# Patient Record
Sex: FEMALE | Employment: UNEMPLOYED | URBAN - METROPOLITAN AREA
[De-identification: names, ages, dates, MRNs, and addresses within clinical notes are randomized per-mention and may not be internally consistent; named-entity substitution may affect disease eponyms.]

---

## 2017-08-21 ENCOUNTER — HOSPITAL ENCOUNTER (EMERGENCY)
Age: 82
Discharge: HOME | End: 2017-08-21
Attending: EMERGENCY MEDICINE
Payer: MEDICARE

## 2017-08-21 ENCOUNTER — APPOINTMENT (OUTPATIENT)
Dept: CT IMAGING | Age: 82
End: 2017-08-21
Payer: MEDICARE

## 2017-08-21 VITALS
HEART RATE: 68 BPM | DIASTOLIC BLOOD PRESSURE: 88 MMHG | TEMPERATURE: 98 F | OXYGEN SATURATION: 97 % | RESPIRATION RATE: 18 BRPM | SYSTOLIC BLOOD PRESSURE: 163 MMHG

## 2017-08-21 DIAGNOSIS — M79.18 MUSCULOSKELETAL PAIN, CHRONIC: Primary | ICD-10-CM

## 2017-08-21 DIAGNOSIS — G89.29 MUSCULOSKELETAL PAIN, CHRONIC: Primary | ICD-10-CM

## 2017-08-21 LAB
-: NORMAL
ABSOLUTE EOS #: 0.1 K/UL (ref 0–0.4)
ABSOLUTE LYMPH #: 1.2 K/UL (ref 1–4.8)
ABSOLUTE MONO #: 0.5 K/UL (ref 0.1–1.2)
ALBUMIN SERPL-MCNC: 4.1 G/DL (ref 3.5–5.2)
ALBUMIN/GLOBULIN RATIO: 1.2 (ref 1–2.5)
ALP BLD-CCNC: 80 U/L (ref 35–104)
ALT SERPL-CCNC: 15 U/L (ref 5–33)
AMORPHOUS: NORMAL
ANION GAP SERPL CALCULATED.3IONS-SCNC: 18 MMOL/L (ref 9–17)
AST SERPL-CCNC: 18 U/L
BACTERIA: NORMAL
BASOPHILS # BLD: 0 %
BASOPHILS ABSOLUTE: 0 K/UL (ref 0–0.2)
BILIRUB SERPL-MCNC: 0.52 MG/DL (ref 0.3–1.2)
BILIRUBIN URINE: NEGATIVE
BUN BLDV-MCNC: 16 MG/DL (ref 8–23)
BUN/CREAT BLD: ABNORMAL (ref 9–20)
CALCIUM SERPL-MCNC: 9.6 MG/DL (ref 8.6–10.4)
CASTS UA: NORMAL /LPF (ref 0–8)
CHLORIDE BLD-SCNC: 106 MMOL/L (ref 98–107)
CO2: 21 MMOL/L (ref 20–31)
COLOR: YELLOW
COMMENT UA: ABNORMAL
CREAT SERPL-MCNC: 0.96 MG/DL (ref 0.5–0.9)
CRYSTALS, UA: NORMAL /HPF
DIFFERENTIAL TYPE: NORMAL
EOSINOPHILS RELATIVE PERCENT: 1 %
EPITHELIAL CELLS UA: NORMAL /HPF (ref 0–5)
GFR AFRICAN AMERICAN: >60 ML/MIN
GFR NON-AFRICAN AMERICAN: 55 ML/MIN
GFR SERPL CREATININE-BSD FRML MDRD: ABNORMAL ML/MIN/{1.73_M2}
GFR SERPL CREATININE-BSD FRML MDRD: ABNORMAL ML/MIN/{1.73_M2}
GLUCOSE BLD-MCNC: 102 MG/DL (ref 70–99)
GLUCOSE URINE: NEGATIVE
HCT VFR BLD CALC: 39.7 % (ref 36–46)
HEMOGLOBIN: 13.4 G/DL (ref 12–16)
KETONES, URINE: NEGATIVE
LEUKOCYTE ESTERASE, URINE: ABNORMAL
LIPASE: 51 U/L (ref 13–60)
LYMPHOCYTES # BLD: 16 %
MCH RBC QN AUTO: 29.6 PG (ref 26–34)
MCHC RBC AUTO-ENTMCNC: 33.7 G/DL (ref 31–37)
MCV RBC AUTO: 87.7 FL (ref 80–100)
MONOCYTES # BLD: 7 %
MUCUS: NORMAL
NITRITE, URINE: NEGATIVE
OTHER OBSERVATIONS UA: NORMAL
PDW BLD-RTO: 15.1 % (ref 12.5–15.4)
PH UA: 7 (ref 5–8)
PLATELET # BLD: 234 K/UL (ref 140–450)
PLATELET ESTIMATE: NORMAL
PMV BLD AUTO: 8.2 FL (ref 6–12)
POTASSIUM SERPL-SCNC: 4 MMOL/L (ref 3.7–5.3)
PROTEIN UA: NEGATIVE
RBC # BLD: 4.52 M/UL (ref 4–5.2)
RBC # BLD: NORMAL 10*6/UL
RBC UA: NORMAL /HPF (ref 0–4)
RENAL EPITHELIAL, UA: NORMAL /HPF
SEG NEUTROPHILS: 76 %
SEGMENTED NEUTROPHILS ABSOLUTE COUNT: 5.4 K/UL (ref 1.8–7.7)
SODIUM BLD-SCNC: 145 MMOL/L (ref 135–144)
SPECIFIC GRAVITY UA: 1 (ref 1–1.03)
TOTAL PROTEIN: 7.6 G/DL (ref 6.4–8.3)
TRICHOMONAS: NORMAL
TROPONIN INTERP: NORMAL
TROPONIN T: <0.03 NG/ML
TURBIDITY: CLEAR
URINE HGB: NEGATIVE
UROBILINOGEN, URINE: NORMAL
WBC # BLD: 7.2 K/UL (ref 3.5–11)
WBC # BLD: NORMAL 10*3/UL
WBC UA: NORMAL /HPF (ref 0–5)
YEAST: NORMAL

## 2017-08-21 PROCEDURE — 6370000000 HC RX 637 (ALT 250 FOR IP): Performed by: STUDENT IN AN ORGANIZED HEALTH CARE EDUCATION/TRAINING PROGRAM

## 2017-08-21 PROCEDURE — 74177 CT ABD & PELVIS W/CONTRAST: CPT

## 2017-08-21 PROCEDURE — 93005 ELECTROCARDIOGRAM TRACING: CPT

## 2017-08-21 PROCEDURE — 6360000004 HC RX CONTRAST MEDICATION: Performed by: STUDENT IN AN ORGANIZED HEALTH CARE EDUCATION/TRAINING PROGRAM

## 2017-08-21 PROCEDURE — 87186 SC STD MICRODIL/AGAR DIL: CPT

## 2017-08-21 PROCEDURE — 80053 COMPREHEN METABOLIC PANEL: CPT

## 2017-08-21 PROCEDURE — 99284 EMERGENCY DEPT VISIT MOD MDM: CPT

## 2017-08-21 PROCEDURE — 84484 ASSAY OF TROPONIN QUANT: CPT

## 2017-08-21 PROCEDURE — 87086 URINE CULTURE/COLONY COUNT: CPT

## 2017-08-21 PROCEDURE — 83690 ASSAY OF LIPASE: CPT

## 2017-08-21 PROCEDURE — 81001 URINALYSIS AUTO W/SCOPE: CPT

## 2017-08-21 PROCEDURE — 99221 1ST HOSP IP/OBS SF/LOW 40: CPT | Performed by: NEUROLOGICAL SURGERY

## 2017-08-21 PROCEDURE — 87088 URINE BACTERIA CULTURE: CPT

## 2017-08-21 PROCEDURE — 85025 COMPLETE CBC W/AUTO DIFF WBC: CPT

## 2017-08-21 RX ORDER — METOPROLOL TARTRATE 50 MG/1
25 TABLET, FILM COATED ORAL ONCE
Status: COMPLETED | OUTPATIENT
Start: 2017-08-21 | End: 2017-08-21

## 2017-08-21 RX ADMIN — IOVERSOL 130 ML: 741 INJECTION INTRA-ARTERIAL; INTRAVENOUS at 13:13

## 2017-08-21 RX ADMIN — METOPROLOL TARTRATE 25 MG: 50 TABLET, FILM COATED ORAL at 13:52

## 2017-08-21 ASSESSMENT — PAIN SCALES - GENERAL: PAINLEVEL_OUTOF10: 7

## 2017-08-23 ENCOUNTER — HOSPITAL ENCOUNTER (OUTPATIENT)
Age: 82
Discharge: HOME | End: 2017-08-23
Payer: MEDICARE

## 2017-08-23 ENCOUNTER — HOSPITAL ENCOUNTER (OUTPATIENT)
Dept: GENERAL RADIOLOGY | Age: 82
Discharge: HOME | End: 2017-08-23
Payer: MEDICARE

## 2017-08-23 DIAGNOSIS — M79.18 MUSCULOSKELETAL PAIN, CHRONIC: ICD-10-CM

## 2017-08-23 DIAGNOSIS — G89.29 MUSCULOSKELETAL PAIN, CHRONIC: ICD-10-CM

## 2017-08-23 LAB
CULTURE: ABNORMAL
CULTURE: ABNORMAL
EKG ATRIAL RATE: 91 BPM
EKG P AXIS: 58 DEGREES
EKG P-R INTERVAL: 210 MS
EKG Q-T INTERVAL: 394 MS
EKG QRS DURATION: 128 MS
EKG QTC CALCULATION (BAZETT): 484 MS
EKG R AXIS: 63 DEGREES
EKG T AXIS: 27 DEGREES
EKG VENTRICULAR RATE: 91 BPM
Lab: ABNORMAL
ORGANISM: ABNORMAL
SPECIMEN DESCRIPTION: ABNORMAL
STATUS: ABNORMAL

## 2017-08-23 PROCEDURE — 72100 X-RAY EXAM L-S SPINE 2/3 VWS: CPT

## 2020-07-23 ENCOUNTER — OUTSIDE SERVICES (OUTPATIENT)
Dept: PRIMARY CARE CLINIC | Age: 85
End: 2020-07-23

## 2020-07-23 ASSESSMENT — ENCOUNTER SYMPTOMS
EYE PAIN: 0
SHORTNESS OF BREATH: 0
NAUSEA: 0
DIARRHEA: 0
CONSTIPATION: 0
EYE REDNESS: 0
COUGH: 0
BACK PAIN: 0

## 2020-07-23 NOTE — PROGRESS NOTES
Resident seen at [address] [facility] at [facility]. Patient has dementia and was recently admitted to assisted living from nursing. Asked to see resident due to anger with family. Alberto Cabrera is a 80 y.o. female who presents today for her medical conditions/complaintsas noted below. Chief Complaint   Patient presents with    Dementia       HPI:     HPI  Asked to see resident due to anger and or depression regarding recent move into assisted living. She admitted to [facility] after a fall and fractured sternum for rehabilitation. At the end of the month she moved to assisted living. She has a history of Atrial Fib, dementia, COPD, HTN  She is doing well in assisted living except for mood. LDL Cholesterol (mg/dL)   Date Value   03/01/2013 68       (goal LDL is <100)   AST (U/L)   Date Value   08/21/2017 18     ALT (U/L)   Date Value   08/21/2017 15     BUN (mg/dL)   Date Value   08/21/2017 16     BP Readings from Last 3 Encounters:   08/21/17 (!) 163/88   03/21/13 154/75          (goal 120/80)    Past Medical History:   Diagnosis Date    Diverticulosis     GERD (gastroesophageal reflux disease)     H/O tubal ligation     Hip fracture, right (HCC)     History of resection of large bowel     Hypertension     Palpitation     S/P appy     Thyroid cancer ([address]       Past Surgical History:   Procedure Laterality Date    APPENDECTOMY      COLON SURGERY      HIP SURGERY      THYROID SURGERY      THYROIDECTOMY      TUBAL LIGATION         Family History   Problem Relation Age of Onset    Cancer Mother     Heart Disease Father        Social History     Tobacco Use    Smoking status: Never Smoker    Smokeless tobacco: Never Used   Substance Use Topics    Alcohol use: No      Current Outpatient Medications   Medication Sig Dispense Refill    sertraline (ZOLOFT) 50 MG tablet Take 50 mg by mouth daily      aspirin 81 MG tablet Take 81 mg by mouth daily.       multivitamin [facility]) per tablet Take 1 tablet by mouth daily.  acetaminophen (TYLENOL) 650 MG suppository Place 650 mg rectally every 4 hours as needed.  Calcium Carbonate (CALTRATE 600 PO) Take  by mouth.  Levothyroxine Sodium (SYNTHROID PO) Take  by mouth.  ALPRAZOLAM PO Take  by mouth.  METOPROLOL TARTRATE by Does not apply route. No current facility-administered medications for this visit. Allergies   Allergen Reactions    Ibuprofen     Pcn [Penicillins]     Sulfa Antibiotics     Morphine Nausea And Vomiting       Health Maintenance   Topic Date Due    DTaP/Tdap/Td vaccine (1 - Tdap) 08/01/1946    Shingles Vaccine (1 of 2) 08/01/1977    Pneumococcal 65+ years Vaccine (1 of 1 - PPSV23) 08/01/1992    Annual Wellness Visit (AWV)  06/23/2019    Flu vaccine (1) 09/01/2020    Hepatitis A vaccine  Aged Out    Hepatitis B vaccine  Aged Out    Hib vaccine  Aged Out    Meningococcal (ACWY) vaccine  Aged Out       Subjective:      Review of Systems   Constitutional: Negative for chills, fatigue and fever. HENT: Negative for congestion and nosebleeds. Eyes: Negative for pain and redness. Respiratory: Negative for cough and shortness of breath. Cardiovascular: Negative for chest pain, palpitations and leg swelling. Gastrointestinal: Negative for constipation, diarrhea and nausea. Endocrine: Negative for polyphagia and polyuria. Genitourinary: Negative for difficulty urinating and dyspareunia. Musculoskeletal: Positive for gait problem (uses walker). Negative for back pain. Skin: Negative for rash and wound. Neurological: Negative for dizziness, light-headedness and headaches. Psychiatric/Behavioral: Positive for agitation. Negative for sleep disturbance. The patient is nervous/anxious. Objective:     See paper chart for vitals  Physical Exam  Vitals signs reviewed. Constitutional:       Appearance: Normal appearance.    HENT:      Head: Normocephalic and

## 2020-08-12 ENCOUNTER — OUTSIDE SERVICES (OUTPATIENT)
Dept: PRIMARY CARE CLINIC | Age: 85
End: 2020-08-12

## 2020-08-12 RX ORDER — SERTRALINE HYDROCHLORIDE 25 MG/1
25 TABLET, FILM COATED ORAL NIGHTLY
Qty: 30 TABLET | Refills: 3 | Status: SHIPPED | OUTPATIENT
Start: 2020-08-12 | End: 2021-01-06 | Stop reason: SDUPTHER

## 2020-08-12 ASSESSMENT — ENCOUNTER SYMPTOMS
DIARRHEA: 0
BACK PAIN: 0
COUGH: 0
EYE PAIN: 0
CONSTIPATION: 0
EYE REDNESS: 0
SHORTNESS OF BREATH: 0

## 2020-08-12 NOTE — PROGRESS NOTES
Stefany Duarte is a 80 y.o. female being seen for her monthly follow up. Location of visit: [facility]    Visit Date:  8/12/2020       Reason for Visit:  No chief complaint on file. HPI : Dav Mejia, her daughter would like someone to a call regarding dementia. Patient still gets very angry with Dav Mejia and her siblings when they call or visit with her. Patient does not think she need to be in assisted living and insist that she can take care of herself. Review of Systems   Constitutional: Positive for fatigue. Negative for chills and fever. HENT: Negative for congestion and nosebleeds. Eyes: Negative for pain and redness. Respiratory: Negative for cough and shortness of breath. Cardiovascular: Positive for leg swelling. Negative for chest pain and palpitations. Gastrointestinal: Negative for constipation and diarrhea. Genitourinary: Negative for difficulty urinating and dysuria. Musculoskeletal: Positive for gait problem. Negative for back pain. Skin: Negative for rash and wound. Neurological: Negative for dizziness, light-headedness and headaches. Psychiatric/Behavioral: Positive for agitation and dysphoric mood. Negative for sleep disturbance. The patient is nervous/anxious. Physical Exam  Vitals signs and nursing note reviewed. Constitutional:       Appearance: Normal appearance. HENT:      Right Ear: External ear normal.      Left Ear: External ear normal.   Neck:      Musculoskeletal: Normal range of motion and neck supple. Cardiovascular:      Rate and Rhythm: Normal rate and regular rhythm. Heart sounds: Normal heart sounds. Comments: No carotid bruit  Pulmonary:      Effort: Pulmonary effort is normal.      Breath sounds: Normal breath sounds. Abdominal:      General: Bowel sounds are normal.      Palpations: Abdomen is soft. Musculoskeletal:      Comments: Minimal ankle edema this morning   Skin:     General: Skin is warm and dry. Capillary Refill: Capillary refill takes less than 2 seconds. Neurological:      Mental Status: She is alert. Psychiatric:         Mood and Affect: Mood is depressed. Affect is angry. Speech: Speech normal.         Behavior: Behavior normal.          Current Outpatient Medications   Medication Sig Dispense Refill    sertraline (ZOLOFT) 25 MG tablet Take 1 tablet by mouth nightly 30 tablet 3    aspirin 81 MG tablet Take 81 mg by mouth daily.  multivitamin (THERAGRAN) per tablet Take 1 tablet by mouth daily.  acetaminophen (TYLENOL) 650 MG suppository Place 650 mg rectally every 4 hours as needed.  Calcium Carbonate (CALTRATE 600 PO) Take  by mouth.  Levothyroxine Sodium (SYNTHROID PO) Take  by mouth.  ALPRAZOLAM PO Take  by mouth.  METOPROLOL TARTRATE by Does not apply route. No current facility-administered medications for this visit. Allergies   Allergen Reactions    Ibuprofen     Pcn [Penicillins]     Sulfa Antibiotics     Morphine Nausea And Vomiting        Past Medical History:   Diagnosis Date    Diverticulosis     GERD (gastroesophageal reflux disease)     H/O tubal ligation     Hip fracture, right (HCC)     History of resection of large bowel     Hypertension     Palpitation     S/P appy     Thyroid [address] ([facility] [address]         ASSESSMENT:   Diagnosis Orders   1. Essential hypertension  CBC With Auto Differential    Comprehensive Metabolic Panel, Fasting    TSH With Reflex Ft4   2. Reactive depression  sertraline (ZOLOFT) 25 MG tablet   3. Hypothyroidism, unspecified type  CBC With Auto Differential    Comprehensive Metabolic Panel, Fasting    TSH With Reflex Ft4   4. Medication management  sertraline (ZOLOFT) 25 MG tablet   5. Dementia without behavioral disturbance, unspecified dementia type ([facility])  CBC With Auto Differential    Comprehensive Metabolic Panel, Fasting    TSH With Reflex Ft4        Plan:  1. Zoloft 25 mg PO QHS  2.  CBC with diff., CMP., TSH with reflex  3. Will do Eugene Randhawa Exam  4.  Look through skilled notes for cognitive exam

## 2020-08-13 ENCOUNTER — OUTSIDE SERVICES (OUTPATIENT)
Dept: PRIMARY CARE CLINIC | Age: 85
End: 2020-08-13

## 2020-08-13 ASSESSMENT — ENCOUNTER SYMPTOMS
SHORTNESS OF BREATH: 0
EYE PAIN: 0
CHOKING: 0
CONSTIPATION: 0
DIARRHEA: 0
EYE REDNESS: 0
BACK PAIN: 1

## 2020-08-14 LAB
ALBUMIN SERPL-MCNC: NORMAL G/DL
ALP BLD-CCNC: NORMAL U/L
ALT SERPL-CCNC: NORMAL U/L
AST SERPL-CCNC: NORMAL U/L
BASOPHILS ABSOLUTE: NORMAL
BASOPHILS RELATIVE PERCENT: NORMAL
BILIRUB SERPL-MCNC: NORMAL MG/DL
BUN BLDV-MCNC: NORMAL MG/DL
CALCIUM SERPL-MCNC: NORMAL MG/DL
CHLORIDE BLD-SCNC: NORMAL MMOL/L
CO2: NORMAL
CREAT SERPL-MCNC: NORMAL MG/DL
EOSINOPHILS ABSOLUTE: NORMAL
EOSINOPHILS RELATIVE PERCENT: NORMAL
GLUCOSE FASTING: NORMAL
HCT VFR BLD CALC: NORMAL %
HEMOGLOBIN: NORMAL
LYMPHOCYTES ABSOLUTE: NORMAL
LYMPHOCYTES RELATIVE PERCENT: NORMAL
MCH RBC QN AUTO: NORMAL PG
MCHC RBC AUTO-ENTMCNC: NORMAL G/DL
MCV RBC AUTO: NORMAL FL
MONOCYTES ABSOLUTE: NORMAL
MONOCYTES RELATIVE PERCENT: NORMAL
NEUTROPHILS ABSOLUTE: NORMAL
NEUTROPHILS RELATIVE PERCENT: NORMAL
PDW BLD-RTO: NORMAL %
PLATELET # BLD: NORMAL 10*3/UL
PMV BLD AUTO: NORMAL FL
POTASSIUM SERPL-SCNC: NORMAL MMOL/L
RBC # BLD: NORMAL 10*6/UL
SODIUM BLD-SCNC: NORMAL MMOL/L
TOTAL PROTEIN: NORMAL
TSH SERPL DL<=0.05 MIU/L-ACNC: NORMAL M[IU]/L
WBC # BLD: NORMAL 10*3/UL

## 2020-08-14 NOTE — PROGRESS NOTES
Telly Kamara is a 80 y.o. female being seen for her dementia testing follow up. Location of visit: [facility]    Visit Date:  8/13/2020       Reason for Visit:    Chief Complaint   Patient presents with    Dementia      HPI:  Follow up today to [facility] Cognitive Assessment   Resident was receptive to answer questions but frequently reminded me she was in her 80's and a little forgetfulness is to expected. She also states she could never draw well. Review of Systems   Constitutional: Positive for fatigue. Negative for chills and fever. HENT: Negative for congestion and nosebleeds. Eyes: Negative for pain and redness. Respiratory: Negative for choking and shortness of breath. Cardiovascular: Negative for chest pain and palpitations. Gastrointestinal: Negative for constipation and diarrhea. Genitourinary: Negative for difficulty urinating and dysuria. Musculoskeletal: Positive for arthralgias and back pain. Skin: Negative for rash and wound. Neurological: Negative for light-headedness and headaches. Psychiatric/Behavioral: Positive for agitation and dysphoric mood. Negative for confusion (denies) and sleep disturbance. The patient is not nervous/anxious. Physical Exam  Vitals signs and nursing note reviewed. Constitutional:       Appearance: Normal appearance. HENT:      Head: Normocephalic and atraumatic. Neck:      Musculoskeletal: Normal range of motion and neck supple. Pulmonary:      Effort: Pulmonary effort is normal.   Neurological:      Mental Status: She is alert and oriented to person, place, and time. Motor: No weakness. Psychiatric:         Mood and Affect: Affect is angry. Cognition and Memory: Cognition is impaired. Memory is impaired. Comments: MOCA score 20/30  Resident stated again that some loss of memory is normal for someone in her 90s.  She became angry when I suggested that assisted living was an appropriate placement and the extra assistance help keep her safe. She stated if she  at home that would be OK with her. Current Outpatient Medications   Medication Sig Dispense Refill    sertraline (ZOLOFT) 25 MG tablet Take 1 tablet by mouth nightly 30 tablet 3    aspirin 81 MG tablet Take 81 mg by mouth daily.  multivitamin (THERAGRAN) per tablet Take 1 tablet by mouth daily.  acetaminophen (TYLENOL) 650 MG suppository Place 650 mg rectally every 4 hours as needed.  Calcium Carbonate (CALTRATE 600 PO) Take  by mouth.  Levothyroxine Sodium (SYNTHROID PO) Take  by mouth.  ALPRAZOLAM PO Take  by mouth.  METOPROLOL TARTRATE by Does not apply route. No current facility-administered medications for this visit. Allergies   Allergen Reactions    Ibuprofen     Pcn [Penicillins]     Sulfa Antibiotics     Morphine Nausea And Vomiting        Past Medical History:   Diagnosis Date    Diverticulosis     GERD (gastroesophageal reflux disease)     H/O tubal ligation     Hip fracture, right (HCC)     History of resection of large bowel     Hypertension     Palpitation     S/P appy     [address] [address]         ASSESSMENT:   Diagnosis Orders   1. Medication management     2. Dementia without behavioral disturbance, unspecified dementia type ([facility] [address]     3. Anger          Plan:  Continue AL placement and assistance with ADLs  Spoke with daughter, Albina Wilburn, by phone and gave results and agreed with family thoughts that AL was appropriate at this time. Encouraged her to try to continue to be supportive and loving to patient even when she gets angry and lashes out at them. It is probably the dementia effecting her short term thoughts and emotions.    Zoloft ordered yesterday as well as lab work

## 2020-09-17 ENCOUNTER — OUTSIDE SERVICES (OUTPATIENT)
Dept: PRIMARY CARE CLINIC | Age: 85
End: 2020-09-17

## 2020-09-17 VITALS — RESPIRATION RATE: 16 BRPM | TEMPERATURE: 97.6 F | DIASTOLIC BLOOD PRESSURE: 80 MMHG | SYSTOLIC BLOOD PRESSURE: 142 MMHG

## 2020-09-17 ASSESSMENT — ENCOUNTER SYMPTOMS
EYE REDNESS: 0
EYE PAIN: 0
BACK PAIN: 1
CHOKING: 0
DIARRHEA: 0
SHORTNESS OF BREATH: 0
CONSTIPATION: 0

## 2020-09-28 ENCOUNTER — OUTSIDE SERVICES (OUTPATIENT)
Dept: PRIMARY CARE CLINIC | Age: 85
End: 2020-09-28

## 2020-09-28 ASSESSMENT — ENCOUNTER SYMPTOMS
EYE DISCHARGE: 0
VOMITING: 0
BACK PAIN: 1
EYE REDNESS: 0
SORE THROAT: 0
DIARRHEA: 0
ABDOMINAL PAIN: 0
NAUSEA: 0
SHORTNESS OF BREATH: 0
WHEEZING: 0
RHINORRHEA: 0
COUGH: 0

## 2020-09-29 NOTE — PROGRESS NOTES
Garrett Cheatham is a 80 y.o. female being seen for her  History and Physical .  [facility] visit[facility]    Visit Date:  9/28/2020       Reason for Visit:    Chief Complaint   Patient presents with    H&P        She is generally pleasant and cooperative however gets upset when discusses being at facility. She still expresses some anger about being at [facility]. She continues to be particularly angry with family. She does have early dementia and down plays her \"forgetfullness\"  Relates to some minor back and hand pain         Review of Systems   Constitutional: Negative for chills and fever. HENT: Negative for congestion, rhinorrhea and sore throat. Eyes: Negative for discharge and redness. Respiratory: Negative for cough, shortness of breath and wheezing. Cardiovascular: Negative for chest pain and palpitations. Gastrointestinal: Negative for abdominal pain, diarrhea, nausea and vomiting. Endocrine: Negative for polydipsia, polyphagia and polyuria. Genitourinary: Negative for difficulty urinating, dysuria and frequency. Musculoskeletal: Positive for arthralgias, back pain and gait problem. Negative for myalgias. Skin: Negative for rash and wound. Neurological: Negative for dizziness, light-headedness and headaches. Psychiatric/Behavioral: Positive for agitation. Negative for dysphoric mood and sleep disturbance. The patient is not nervous/anxious. Physical Exam  Vitals signs and nursing note reviewed. Constitutional:       Appearance: She is obese. HENT:      Head: Normocephalic and atraumatic. Right Ear: External ear normal.      Left Ear: External ear normal.   Eyes:      Extraocular Movements: Extraocular movements intact. Conjunctiva/sclera: Conjunctivae normal.   Neck:      Musculoskeletal: Normal range of motion and neck supple. Cardiovascular:      Rate and Rhythm: Normal rate and regular rhythm.       Comments: No carotid bruit  Pulmonary: Effort: Pulmonary effort is normal.      Breath sounds: Normal breath sounds. Abdominal:      General: Bowel sounds are normal.      Palpations: Abdomen is soft. Tenderness: There is no abdominal tenderness. Musculoskeletal:      Right lower leg: No edema. Left lower leg: No edema. Skin:     General: Skin is warm and dry. Capillary Refill: Capillary refill takes less than 2 seconds. Neurological:      Mental Status: She is alert and oriented to person, place, and time. Sensory: No sensory deficit. Motor: No weakness. Gait: Gait abnormal (uses rollator). Psychiatric:         Mood and Affect: Affect is angry (when talking about being at [facility]). Speech: Speech normal.         Behavior: Behavior normal. Behavior is cooperative. Cognition and Memory: She exhibits impaired recent memory. Current Outpatient Medications   Medication Sig Dispense Refill    sertraline (ZOLOFT) 25 MG tablet Take 1 tablet by mouth nightly 30 tablet 3    aspirin 81 MG tablet Take 81 mg by mouth daily.  multivitamin (THERAGRAN) per tablet Take 1 tablet by mouth daily.  acetaminophen (TYLENOL) 650 MG suppository Place 650 mg rectally every 4 hours as needed.  Calcium Carbonate (CALTRATE 600 PO) Take  by mouth.  Levothyroxine Sodium (SYNTHROID PO) Take  by mouth.  ALPRAZOLAM PO Take  by mouth.  METOPROLOL TARTRATE by Does not apply route. No current facility-administered medications for this visit.          Allergies   Allergen Reactions    Ibuprofen     Pcn [Penicillins]     Sulfa Antibiotics     Morphine Nausea And Vomiting        Past Medical History:   Diagnosis Date    Diverticulosis     GERD (gastroesophageal reflux disease)     H/O tubal ligation     Hip fracture, right (HCC)     History of resection of large bowel     Hypertension     Palpitation     S/P appy     [address] [facility] [address] [address]         ASSESSMENT:  There were no vitals taken for this visit. Diagnosis Orders   1. Essential hypertension     2. Hypothyroidism, unspecified type     3. Dementia without behavioral disturbance, unspecified dementia type ([facility] [address]          Plan:  Continue current medications. Appropriate to move to independent living.   May help with some anger

## 2020-10-21 RX ORDER — DONEPEZIL HYDROCHLORIDE 10 MG/1
10 TABLET, FILM COATED ORAL NIGHTLY
Qty: 90 TABLET | Refills: 1
Start: 2020-10-21

## 2020-10-21 RX ORDER — IBUPROFEN 600 MG/1
600 TABLET ORAL 3 TIMES DAILY PRN
Qty: 90 TABLET | Refills: 0
Start: 2020-10-21 | End: 2021-05-03 | Stop reason: SINTOL

## 2020-10-21 RX ORDER — POLYETHYLENE GLYCOL 3350 17 G/17G
17 POWDER, FOR SOLUTION ORAL DAILY PRN
Qty: 510 G | Refills: 0
Start: 2020-10-21 | End: 2020-11-20

## 2020-10-21 RX ORDER — METOPROLOL TARTRATE 75 MG/1
37.5 TABLET, FILM COATED ORAL DAILY
Qty: 60 TABLET | Refills: 2
Start: 2020-10-21

## 2020-10-21 RX ORDER — LEVOTHYROXINE SODIUM 0.15 MG/1
150 TABLET ORAL DAILY
COMMUNITY
Start: 2020-07-14 | End: 2020-10-21 | Stop reason: SDUPTHER

## 2020-10-21 RX ORDER — LEVOTHYROXINE SODIUM 175 UG/1
175 TABLET ORAL DAILY
Qty: 30 TABLET | Refills: 3 | Status: SHIPPED | OUTPATIENT
Start: 2020-10-21 | End: 2021-01-25 | Stop reason: SDUPTHER

## 2020-10-21 RX ORDER — FUROSEMIDE 20 MG/1
20 TABLET ORAL DAILY
Qty: 90 TABLET | Refills: 1
Start: 2020-10-21 | End: 2021-01-06 | Stop reason: SDUPTHER

## 2020-10-21 RX ORDER — GREEN TEA/HOODIA GORDONII 315-12.5MG
1 CAPSULE ORAL DAILY
Qty: 30 TABLET | Refills: 3 | Status: SHIPPED | OUTPATIENT
Start: 2020-10-21 | End: 2020-11-20

## 2020-10-21 RX ORDER — BUSPIRONE HYDROCHLORIDE 5 MG/1
5 TABLET ORAL 2 TIMES DAILY
Qty: 60 TABLET | Refills: 0
Start: 2020-10-21 | End: 2020-11-20

## 2020-10-21 RX ORDER — TRAMADOL HYDROCHLORIDE 50 MG/1
50 TABLET ORAL 3 TIMES DAILY PRN
Qty: 20 TABLET | Refills: 0
Start: 2020-10-21 | End: 2020-10-28

## 2020-10-21 RX ORDER — DOCUSATE SODIUM 100 MG/1
100 CAPSULE, LIQUID FILLED ORAL DAILY
Qty: 60 CAPSULE | Refills: 0
Start: 2020-10-21 | End: 2020-11-20

## 2020-10-21 ASSESSMENT — ENCOUNTER SYMPTOMS
VOMITING: 0
DIARRHEA: 1
COUGH: 0
SHORTNESS OF BREATH: 0
NAUSEA: 1
ANAL BLEEDING: 0

## 2020-10-22 NOTE — PROGRESS NOTES
Stephanie Riggins is a 80 y.o. female being seen for her acute follow up. Location of visit: [facility] and  patient residence    Visit Date:  10/21/2020       Reason for Visit:  No chief complaint on file. Diarrhea and upset stomach started Sunday night. She had diarrhea all day yesterday but is feeling a little better today and starting to get appetite back. TSH is high  She is still angry about being at [facility]       Review of Systems   Constitutional: Positive for appetite change and fatigue. Negative for chills and fever. Respiratory: Negative for cough and shortness of breath. Cardiovascular: Negative for chest pain and leg swelling. Gastrointestinal: Positive for diarrhea and nausea. Negative for anal bleeding and vomiting. Endocrine: Negative for polydipsia and polyphagia. Genitourinary: Negative for difficulty urinating and dysuria. Musculoskeletal: Positive for gait problem. Negative for myalgias. Skin: Negative for rash and wound. Neurological: Negative for dizziness, light-headedness and headaches. Psychiatric/Behavioral: Positive for agitation. Negative for sleep disturbance. The patient is nervous/anxious. Physical Exam  Vitals signs and nursing note reviewed. Constitutional:       General: She is not in acute distress. Appearance: She is well-developed. She is not ill-appearing. HENT:      Head: Normocephalic and atraumatic. Right Ear: External ear normal.      Left Ear: External ear normal.   Eyes:      General: No scleral icterus. Right eye: No discharge. Left eye: No discharge. Conjunctiva/sclera: Conjunctivae normal.      Pupils: Pupils are equal, round, and reactive to light. Neck:      Thyroid: No thyromegaly. Trachea: No tracheal deviation. Cardiovascular:      Rate and Rhythm: Normal rate and regular rhythm. Heart sounds: Normal heart sounds.    Pulmonary:      Effort: Pulmonary effort is normal. No respiratory distress. Breath sounds: Normal breath sounds. No wheezing. Abdominal:      General: Bowel sounds are increased. Palpations: Abdomen is soft. Tenderness: There is no abdominal tenderness. Lymphadenopathy:      Cervical: No cervical adenopathy. Skin:     General: Skin is warm. Capillary Refill: Capillary refill takes less than 2 seconds. Findings: No rash. Neurological:      Mental Status: She is alert and oriented to person, place, and time. Gait: Gait abnormal.   Psychiatric:         Mood and Affect: Mood normal.         Behavior: Behavior normal.         Thought Content: Thought content normal.          Current Outpatient Medications   Medication Sig Dispense Refill    metoprolol tartrate (LOPRESSOR) 25 MG tablet Take 25 mg by mouth daily      levothyroxine (SYNTHROID) 175 MCG tablet Take 1 tablet by mouth daily 30 tablet 3    busPIRone (BUSPAR) 5 MG tablet Take 1 tablet by mouth 2 times daily 60 tablet 0    docusate sodium (COLACE) 100 MG capsule Take 1 capsule by mouth daily 60 capsule 0    donepezil (ARICEPT) 10 MG tablet Take 1 tablet by mouth nightly 90 tablet 1    furosemide (LASIX) 20 MG tablet Take 1 tablet by mouth daily 90 tablet 1    Metoprolol Tartrate 75 MG TABS Take 37.5 mg by mouth daily 60 tablet 2    ibuprofen (ADVIL;MOTRIN) 600 MG tablet Take 1 tablet by mouth 3 times daily as needed for Pain 90 tablet 0    polyethylene glycol (GLYCOLAX) 17 GM/SCOOP powder Take 17 g by mouth daily as needed (constipation) 510 g 0    traMADol (ULTRAM) 50 MG tablet Take 1 tablet by mouth 3 times daily as needed for Pain for up to 7 days. 20 tablet 0    Probiotic Acidophilus (FLORANEX) TABS Take 1 tablet by mouth daily 30 tablet 3    sertraline (ZOLOFT) 25 MG tablet Take 1 tablet by mouth nightly 30 tablet 3    aspirin 81 MG tablet Take 81 mg by mouth daily.  multivitamin (THERAGRAN) per tablet Take 1 tablet by mouth daily.       acetaminophen (TYLENOL) 650 MG suppository Place 650 mg rectally every 4 hours as needed.  Calcium Carbonate (CALTRATE 600 PO) Take  by mouth. No current facility-administered medications for this visit. Allergies   Allergen Reactions    Ibuprofen     Pcn [Penicillins]     Sulfa Antibiotics     Morphine Nausea And Vomiting        Past Medical History:   Diagnosis Date    Diverticulosis     GERD (gastroesophageal reflux disease)     H/O tubal ligation     Hip fracture, right (HCC)     History of resection of large bowel     Hypertension     Palpitation     S/P appy     Thyroid cancer ([facility] [address]         ASSESSMENT:  There were no vitals taken for this visit. Diagnosis Orders   1. Hypothyroidism, unspecified type  levothyroxine (SYNTHROID) 175 MCG tablet    TSH    T4, Free   2. Dementia without behavioral disturbance, unspecified dementia type (HCC)  donepezil (ARICEPT) 10 MG tablet   3. Gastroenteritis  Probiotic Acidophilus (FLORANEX) TABS   4. Essential hypertension  furosemide (LASIX) 20 MG tablet    Metoprolol Tartrate 75 MG TABS   5. Constipation, unspecified constipation type  docusate sodium (COLACE) 100 MG capsule    polyethylene glycol (GLYCOLAX) 17 GM/SCOOP powder   6. Sternum pain  ibuprofen (ADVIL;MOTRIN) 600 MG tablet    traMADol (ULTRAM) 50 MG tablet   7. Anger  busPIRone (BUSPAR) 5 MG tablet        Plan:  1. Increase levothyroxine to 175 mcg PO QD  2. Repeat TSH & Free T4 in 3 months  3. Probiotic Acidophilus PO QD  4. Hold docusate sodium if having diarrhea  5.  Will give flu shot next week

## 2020-10-28 ENCOUNTER — OUTSIDE SERVICES (OUTPATIENT)
Dept: PRIMARY CARE CLINIC | Age: 85
End: 2020-10-28

## 2020-10-28 VITALS
SYSTOLIC BLOOD PRESSURE: 132 MMHG | TEMPERATURE: 98.5 F | HEART RATE: 60 BPM | DIASTOLIC BLOOD PRESSURE: 80 MMHG | RESPIRATION RATE: 18 BRPM

## 2020-10-28 ASSESSMENT — ENCOUNTER SYMPTOMS
CONSTIPATION: 0
RHINORRHEA: 0
EYE REDNESS: 0
SINUS PAIN: 0
NAUSEA: 0
SHORTNESS OF BREATH: 0
WHEEZING: 0
BACK PAIN: 1
EYE PAIN: 0
DIARRHEA: 0
COUGH: 1

## 2020-10-29 NOTE — PROGRESS NOTES
Angie Reveles is a 80 y.o. female being seen for her weekly follow up. Location of visit: [facility] and  patient residence    Visit Date:  10/28/2020       Reason for Visit:    Chief Complaint   Patient presents with    URI        HPI   She continues to have a cold with cough and congestion. Covid-19 test negative  Denies fever and chills  States generally improving and cough is subsiding    Review of Systems   Constitutional: Positive for fatigue. Negative for chills and fever. HENT: Positive for congestion. Negative for postnasal drip, rhinorrhea and sinus pain. Eyes: Negative for pain and redness. Respiratory: Positive for cough. Negative for shortness of breath and wheezing. Cardiovascular: Negative for chest pain, palpitations and leg swelling. Gastrointestinal: Negative for constipation, diarrhea and nausea. Genitourinary: Negative for difficulty urinating and dysuria. Musculoskeletal: Positive for back pain and gait problem. Skin: Negative for rash and wound. Neurological: Negative for light-headedness and headaches. Psychiatric/Behavioral: Positive for agitation. Negative for dysphoric mood and sleep disturbance. The patient is not nervous/anxious. Physical Exam  Vitals signs and nursing note reviewed. Constitutional:       General: She is not in acute distress. Appearance: She is well-developed. She is not ill-appearing. HENT:      Head: Normocephalic and atraumatic. Right Ear: External ear normal.      Left Ear: External ear normal.   Eyes:      General: No scleral icterus. Right eye: No discharge. Left eye: No discharge. Conjunctiva/sclera: Conjunctivae normal.      Pupils: Pupils are equal, round, and reactive to light. Neck:      Thyroid: No thyromegaly. Trachea: No tracheal deviation. Cardiovascular:      Rate and Rhythm: Normal rate and regular rhythm. Heart sounds: Normal heart sounds. Pulmonary:      Effort: Pulmonary effort is normal. No respiratory distress. Breath sounds: Wheezing present. Abdominal:      General: Bowel sounds are normal.      Palpations: Abdomen is soft. Lymphadenopathy:      Cervical: No cervical adenopathy. Skin:     General: Skin is warm. Capillary Refill: Capillary refill takes less than 2 seconds. Findings: No rash. Neurological:      Mental Status: She is alert and oriented to person, place, and time. Psychiatric:         Mood and Affect: Mood normal.         Behavior: Behavior normal.         Thought Content: Thought content normal.          Current Outpatient Medications   Medication Sig Dispense Refill    metoprolol tartrate (LOPRESSOR) 25 MG tablet Take 25 mg by mouth daily      levothyroxine (SYNTHROID) 175 MCG tablet Take 1 tablet by mouth daily 30 tablet 3    busPIRone (BUSPAR) 5 MG tablet Take 1 tablet by mouth 2 times daily 60 tablet 0    docusate sodium (COLACE) 100 MG capsule Take 1 capsule by mouth daily 60 capsule 0    donepezil (ARICEPT) 10 MG tablet Take 1 tablet by mouth nightly 90 tablet 1    furosemide (LASIX) 20 MG tablet Take 1 tablet by mouth daily 90 tablet 1    Metoprolol Tartrate 75 MG TABS Take 37.5 mg by mouth daily 60 tablet 2    ibuprofen (ADVIL;MOTRIN) 600 MG tablet Take 1 tablet by mouth 3 times daily as needed for Pain 90 tablet 0    polyethylene glycol (GLYCOLAX) 17 GM/SCOOP powder Take 17 g by mouth daily as needed (constipation) 510 g 0    traMADol (ULTRAM) 50 MG tablet Take 1 tablet by mouth 3 times daily as needed for Pain for up to 7 days. 20 tablet 0    Probiotic Acidophilus (FLORANEX) TABS Take 1 tablet by mouth daily 30 tablet 3    sertraline (ZOLOFT) 25 MG tablet Take 1 tablet by mouth nightly 30 tablet 3    aspirin 81 MG tablet Take 81 mg by mouth daily.  multivitamin (THERAGRAN) per tablet Take 1 tablet by mouth daily.       acetaminophen (TYLENOL) 650 MG suppository Place 650 mg rectally every 4 hours as needed.  Calcium Carbonate (CALTRATE 600 PO) Take  by mouth. No current facility-administered medications for this visit. Allergies   Allergen Reactions    Ibuprofen     Pcn [Penicillins]     Sulfa Antibiotics     Morphine Nausea And Vomiting        Past Medical History:   Diagnosis Date    Diverticulosis     GERD (gastroesophageal reflux disease)     H/O tubal ligation     Hip fracture, right (HCC)     History of resection of large bowel     Hypertension     Palpitation     S/P appy     Thyroid cancer (HCC)         ASSESSMENT:  /80   Pulse 60   Temp 98.5 °F (36.9 °C)   Resp 18       Diagnosis Orders   1.  Upper respiratory tract infection, unspecified type          Plan:  Rest and increase fluids  If symptoms do not improve call office  Influenza vaccine next week

## 2021-01-06 ENCOUNTER — OUTSIDE SERVICES (OUTPATIENT)
Dept: PRIMARY CARE CLINIC | Age: 86
End: 2021-01-06

## 2021-01-06 VITALS
HEART RATE: 54 BPM | SYSTOLIC BLOOD PRESSURE: 152 MMHG | DIASTOLIC BLOOD PRESSURE: 78 MMHG | RESPIRATION RATE: 18 BRPM | OXYGEN SATURATION: 97 % | TEMPERATURE: 98.1 F

## 2021-01-06 DIAGNOSIS — F32.9 REACTIVE DEPRESSION: ICD-10-CM

## 2021-01-06 DIAGNOSIS — E03.9 HYPOTHYROIDISM, UNSPECIFIED TYPE: ICD-10-CM

## 2021-01-06 DIAGNOSIS — R60.0 LOCALIZED EDEMA: ICD-10-CM

## 2021-01-06 DIAGNOSIS — Z79.899 MEDICATION MANAGEMENT: ICD-10-CM

## 2021-01-06 DIAGNOSIS — I10 ESSENTIAL HYPERTENSION: Primary | ICD-10-CM

## 2021-01-06 DIAGNOSIS — R45.4 ANGER: ICD-10-CM

## 2021-01-06 RX ORDER — FUROSEMIDE 40 MG/1
40 TABLET ORAL DAILY
Qty: 90 TABLET | Refills: 1 | Status: SHIPPED | OUTPATIENT
Start: 2021-01-06

## 2021-01-06 RX ORDER — POTASSIUM CHLORIDE 750 MG/1
10 TABLET, EXTENDED RELEASE ORAL DAILY
Qty: 90 TABLET | Refills: 1 | Status: SHIPPED | OUTPATIENT
Start: 2021-01-06

## 2021-01-06 RX ORDER — SERTRALINE HYDROCHLORIDE 25 MG/1
50 TABLET, FILM COATED ORAL NIGHTLY
Qty: 90 TABLET | Refills: 1 | Status: SHIPPED | OUTPATIENT
Start: 2021-01-06 | End: 2021-03-16 | Stop reason: SDUPTHER

## 2021-01-06 ASSESSMENT — ENCOUNTER SYMPTOMS
CONSTIPATION: 0
EYE REDNESS: 0
COUGH: 0
NAUSEA: 0
EYE PAIN: 0
BACK PAIN: 0
RHINORRHEA: 1
SHORTNESS OF BREATH: 0
DIARRHEA: 0

## 2021-01-07 NOTE — PROGRESS NOTES
Josué Huizar is a 80 y.o. female being seen for her 3 month follow up. Location of visit: [facility] and  patient residence    Visit Date:  1/6/2021       Reason for Visit:    Chief Complaint   Patient presents with    Hypertension        HPI   Relates no concerns except she does not want to be at [facility]. States no one tells her anything. She is upset that she just put in a room and left. Review of Systems   Constitutional: Positive for fatigue. Negative for chills and fever. HENT: Positive for rhinorrhea. Negative for congestion. Eyes: Negative for pain and redness. Respiratory: Negative for cough and shortness of breath. Cardiovascular: Positive for leg swelling. Negative for chest pain and palpitations. Gastrointestinal: Negative for constipation, diarrhea and nausea. Endocrine: Negative for polydipsia and polyphagia. Genitourinary: Negative for difficulty urinating and dysuria. Musculoskeletal: Positive for gait problem. Negative for back pain. Skin: Negative for rash and wound. Psychiatric/Behavioral: Positive for sleep disturbance. Negative for dysphoric mood. The patient is not nervous/anxious. Physical Exam  Vitals signs and nursing note reviewed. Constitutional:       Appearance: Normal appearance. HENT:      Head: Normocephalic and atraumatic. Right Ear: External ear normal.      Left Ear: External ear normal.   Eyes:      Extraocular Movements: Extraocular movements intact. Conjunctiva/sclera: Conjunctivae normal.   Neck:      Musculoskeletal: Normal range of motion and neck supple. Cardiovascular:      Rate and Rhythm: Normal rate and regular rhythm. Heart sounds: Normal heart sounds. Comments: Bilateral lower extremity edema +1-2  Pulmonary:      Effort: Pulmonary effort is normal.      Breath sounds: Normal breath sounds. Abdominal:      General: Bowel sounds are normal.      Palpations: Abdomen is soft.    Skin: General: Skin is warm and dry. Capillary Refill: Capillary refill takes less than 2 seconds. Neurological:      Mental Status: She is alert and oriented to person, place, and time. Gait: Gait abnormal.   Psychiatric:         Attention and Perception: Attention normal.         Mood and Affect: Affect is angry. Speech: Speech normal.         Cognition and Memory: She exhibits impaired recent memory. Current Outpatient Medications   Medication Sig Dispense Refill    furosemide (LASIX) 40 MG tablet Take 1 tablet by mouth daily 90 tablet 1    sertraline (ZOLOFT) 25 MG tablet Take 2 tablets by mouth nightly 90 tablet 1    potassium chloride (KLOR-CON M) 10 MEQ extended release tablet Take 1 tablet by mouth daily 90 tablet 1    metoprolol tartrate (LOPRESSOR) 25 MG tablet Take 25 mg by mouth daily      levothyroxine (SYNTHROID) 175 MCG tablet Take 1 tablet by mouth daily 30 tablet 3    donepezil (ARICEPT) 10 MG tablet Take 1 tablet by mouth nightly 90 tablet 1    Metoprolol Tartrate 75 MG TABS Take 37.5 mg by mouth daily 60 tablet 2    ibuprofen (ADVIL;MOTRIN) 600 MG tablet Take 1 tablet by mouth 3 times daily as needed for Pain 90 tablet 0    aspirin 81 MG tablet Take 81 mg by mouth daily.  multivitamin (THERAGRAN) per tablet Take 1 tablet by mouth daily.  acetaminophen (TYLENOL) 650 MG suppository Place 650 mg rectally every 4 hours as needed.  Calcium Carbonate (CALTRATE 600 PO) Take  by mouth. No current facility-administered medications for this visit.          Allergies   Allergen Reactions    Ibuprofen     Pcn [Penicillins]     Sulfa Antibiotics     Morphine Nausea And Vomiting        Past Medical History:   Diagnosis Date    Diverticulosis     GERD (gastroesophageal reflux disease)     H/O tubal ligation     Hip fracture, right (HCC)     History of resection of large bowel     Hypertension     Palpitation     S/P appy  Thyroid cancer (HCC)         ASSESSMENT:  BP (!) 152/78   Pulse 54   Temp 98.1 °F (36.7 °C)   Resp 18   SpO2 97%       Diagnosis Orders   1. Essential hypertension  furosemide (LASIX) 40 MG tablet    potassium chloride (KLOR-CON M) 10 MEQ extended release tablet    Basic Metabolic Panel   2. Hypothyroidism, unspecified type     3. Anger     4. Localized edema  furosemide (LASIX) 40 MG tablet    potassium chloride (KLOR-CON M) 10 MEQ extended release tablet   5. Reactive depression  sertraline (ZOLOFT) 25 MG tablet   6. Medication management  sertraline (ZOLOFT) 25 MG tablet        Plan:  1. Increase lasix to 40 mg QD  2. KCl 10 mEq PO QD  3. Increase Zoloft to 50 mg QHS  4. Draw BMP with TSH due later in month  5.  Increase ambulation

## 2021-01-25 DIAGNOSIS — E03.9 HYPOTHYROIDISM, UNSPECIFIED TYPE: ICD-10-CM

## 2021-01-25 DIAGNOSIS — I10 ESSENTIAL HYPERTENSION: ICD-10-CM

## 2021-01-25 DIAGNOSIS — I10 ESSENTIAL HYPERTENSION: Primary | ICD-10-CM

## 2021-01-25 RX ORDER — LEVOTHYROXINE SODIUM 0.2 MG/1
200 TABLET ORAL DAILY
Qty: 30 TABLET | Refills: 3 | Status: SHIPPED | OUTPATIENT
Start: 2021-01-25

## 2021-01-27 ENCOUNTER — TELEPHONE (OUTPATIENT)
Dept: PRIMARY CARE CLINIC | Age: 86
End: 2021-01-27

## 2021-01-27 DIAGNOSIS — R19.7 DIARRHEA, UNSPECIFIED TYPE: Primary | ICD-10-CM

## 2021-01-27 NOTE — TELEPHONE ENCOUNTER
Pts daughter calling and states that her mother had some new Rx's and her levothyroxine (SYNTHROID) 200 MCG tablet was increased. She states that her mother had in the past thyroid cancer and sees a specialist and she woul dike to know if something was srong that made her levothyroxine (SYNTHROID) 200 MCG tablet get increased.  She is asking to have a call back to discuss this (she is [place])   Moni Trevizo [phone]

## 2021-01-28 NOTE — TELEPHONE ENCOUNTER
Please call [facility] ([phone]) and tell them I ordered Stool Aerobic Culture and Stool for H. Pylori Antigen. Fax order to [facility]. Please call results of thyroid test (TSH) to Dr. Malick Purdy' office and ask if they would like further testing or to see patient as she has a history of thyroid cancer. Please put note in  S[facility]er to call daughter, Dago Ahmadi, when doing visit.

## 2021-01-29 NOTE — TELEPHONE ENCOUNTER
[facility] notified and faxed the orders to them. [address] and was transfered 2x and the last VM was full then disconnected. Faxed the results along with a note attached to the cover sheet to please advise if they want to see her or get BW done.   [facility]ix done

## 2021-02-25 ENCOUNTER — OUTSIDE SERVICES (OUTPATIENT)
Dept: PRIMARY CARE CLINIC | Age: 86
End: 2021-02-25

## 2021-02-25 VITALS
SYSTOLIC BLOOD PRESSURE: 144 MMHG | TEMPERATURE: 97.6 F | HEART RATE: 63 BPM | OXYGEN SATURATION: 94 % | DIASTOLIC BLOOD PRESSURE: 72 MMHG | RESPIRATION RATE: 20 BRPM

## 2021-02-25 DIAGNOSIS — R44.3 HALLUCINATIONS: ICD-10-CM

## 2021-02-25 DIAGNOSIS — C73 THYROID CANCER (HCC): ICD-10-CM

## 2021-02-25 DIAGNOSIS — R41.82 ALTERED MENTAL STATUS, UNSPECIFIED ALTERED MENTAL STATUS TYPE: Primary | ICD-10-CM

## 2021-03-02 LAB
T3 FREE: NORMAL
T4 FREE: NORMAL
TSH SERPL DL<=0.05 MIU/L-ACNC: NORMAL M[IU]/L

## 2021-03-16 ENCOUNTER — TELEPHONE (OUTPATIENT)
Dept: PRIMARY CARE CLINIC | Age: 86
End: 2021-03-16

## 2021-03-16 DIAGNOSIS — Z79.899 MEDICATION MANAGEMENT: ICD-10-CM

## 2021-03-16 DIAGNOSIS — F32.9 REACTIVE DEPRESSION: ICD-10-CM

## 2021-03-16 RX ORDER — SERTRALINE HYDROCHLORIDE 25 MG/1
25 TABLET, FILM COATED ORAL NIGHTLY
Qty: 90 TABLET | Refills: 1 | Status: SHIPPED | OUTPATIENT
Start: 2021-03-16 | End: 2021-05-03 | Stop reason: SDUPTHER

## 2021-03-16 NOTE — TELEPHONE ENCOUNTER
Daughter texted and states patient is still having hallucinations. UA negative and blood work OK. Will decrease Zoloft to 25 mg nightly.

## 2021-04-15 DIAGNOSIS — C73 THYROID CANCER (HCC): ICD-10-CM

## 2021-05-03 ENCOUNTER — OFFICE VISIT (OUTPATIENT)
Dept: PRIMARY CARE CLINIC | Age: 86
End: 2021-05-03
Payer: MEDICARE

## 2021-05-03 VITALS
WEIGHT: 160.4 LBS | DIASTOLIC BLOOD PRESSURE: 64 MMHG | HEART RATE: 65 BPM | BODY MASS INDEX: 30.31 KG/M2 | OXYGEN SATURATION: 97 % | SYSTOLIC BLOOD PRESSURE: 126 MMHG

## 2021-05-03 DIAGNOSIS — M54.50 CHRONIC BILATERAL LOW BACK PAIN WITHOUT SCIATICA: ICD-10-CM

## 2021-05-03 DIAGNOSIS — F32.9 REACTIVE DEPRESSION: Primary | ICD-10-CM

## 2021-05-03 DIAGNOSIS — G89.29 CHRONIC BILATERAL LOW BACK PAIN WITHOUT SCIATICA: ICD-10-CM

## 2021-05-03 DIAGNOSIS — Z79.899 MEDICATION MANAGEMENT: ICD-10-CM

## 2021-05-03 DIAGNOSIS — Z91.81 AT HIGH RISK FOR FALLS: ICD-10-CM

## 2021-05-03 PROBLEM — M81.0 AGE RELATED OSTEOPOROSIS: Status: ACTIVE | Noted: 2021-05-03

## 2021-05-03 PROBLEM — K21.9 GASTROESOPHAGEAL REFLUX DISEASE: Status: ACTIVE | Noted: 2021-05-03

## 2021-05-03 PROCEDURE — 4040F PNEUMOC VAC/ADMIN/RCVD: CPT | Performed by: NURSE PRACTITIONER

## 2021-05-03 PROCEDURE — 1036F TOBACCO NON-USER: CPT | Performed by: NURSE PRACTITIONER

## 2021-05-03 PROCEDURE — G8427 DOCREV CUR MEDS BY ELIG CLIN: HCPCS | Performed by: NURSE PRACTITIONER

## 2021-05-03 PROCEDURE — 1090F PRES/ABSN URINE INCON ASSESS: CPT | Performed by: NURSE PRACTITIONER

## 2021-05-03 PROCEDURE — G8419 CALC BMI OUT NRM PARAM NOF/U: HCPCS | Performed by: NURSE PRACTITIONER

## 2021-05-03 PROCEDURE — 99213 OFFICE O/P EST LOW 20 MIN: CPT | Performed by: NURSE PRACTITIONER

## 2021-05-03 PROCEDURE — 1123F ACP DISCUSS/DSCN MKR DOCD: CPT | Performed by: NURSE PRACTITIONER

## 2021-05-03 RX ORDER — BUSPIRONE HYDROCHLORIDE 5 MG/1
TABLET ORAL
COMMUNITY
Start: 2021-03-11

## 2021-05-03 RX ORDER — SERTRALINE HYDROCHLORIDE 25 MG/1
25 TABLET, FILM COATED ORAL EVERY OTHER DAY
Qty: 4 TABLET | Refills: 0 | Status: SHIPPED | OUTPATIENT
Start: 2021-05-03 | End: 2021-05-10

## 2021-05-03 RX ORDER — PSEUDOEPHEDRINE HCL 30 MG
250 TABLET ORAL DAILY
COMMUNITY

## 2021-05-03 RX ORDER — DULOXETIN HYDROCHLORIDE 30 MG/1
30 CAPSULE, DELAYED RELEASE ORAL DAILY
Qty: 30 CAPSULE | Refills: 3 | Status: SHIPPED | OUTPATIENT
Start: 2021-05-03

## 2021-05-03 RX ORDER — SENNOSIDES 8.6 MG
650 CAPSULE ORAL 2 TIMES DAILY
Qty: 60 TABLET | Refills: 3 | Status: SHIPPED | OUTPATIENT
Start: 2021-05-03

## 2021-05-03 SDOH — ECONOMIC STABILITY: FOOD INSECURITY: WITHIN THE PAST 12 MONTHS, THE FOOD YOU BOUGHT JUST DIDN'T LAST AND YOU DIDN'T HAVE MONEY TO GET MORE.: PATIENT DECLINED

## 2021-05-03 SDOH — ECONOMIC STABILITY: INCOME INSECURITY: HOW HARD IS IT FOR YOU TO PAY FOR THE VERY BASICS LIKE FOOD, HOUSING, MEDICAL CARE, AND HEATING?: PATIENT DECLINED

## 2021-05-03 SDOH — ECONOMIC STABILITY: FOOD INSECURITY: WITHIN THE PAST 12 MONTHS, YOU WORRIED THAT YOUR FOOD WOULD RUN OUT BEFORE YOU GOT MONEY TO BUY MORE.: PATIENT DECLINED

## 2021-05-03 ASSESSMENT — PATIENT HEALTH QUESTIONNAIRE - PHQ9
1. LITTLE INTEREST OR PLEASURE IN DOING THINGS: 0
SUM OF ALL RESPONSES TO PHQ9 QUESTIONS 1 & 2: 2
2. FEELING DOWN, DEPRESSED OR HOPELESS: 2
SUM OF ALL RESPONSES TO PHQ QUESTIONS 1-9: 2

## 2021-05-03 ASSESSMENT — ENCOUNTER SYMPTOMS
SHORTNESS OF BREATH: 0
SINUS PAIN: 0
DIARRHEA: 1
COUGH: 1
SINUS PRESSURE: 0
NAUSEA: 0
BACK PAIN: 1

## 2021-05-03 NOTE — PROGRESS NOTES
sounds: Normal heart sounds. Comments: Minimal bilateral lower extremity edema  No carotid bruit    Pulmonary:      Effort: Pulmonary effort is normal.      Breath sounds: Normal breath sounds. Abdominal:      General: Bowel sounds are normal.      Palpations: Abdomen is soft. Musculoskeletal:      Lumbar back: She exhibits decreased range of motion, tenderness, bony tenderness and pain. Comments: Lumbar spine tender to palpation as well as muscles bilateral to spine. Skin:     General: Skin is warm and dry. Capillary Refill: Capillary refill takes less than 2 seconds. Neurological:      Mental Status: She is alert and oriented to person, place, and time. Gait: Gait abnormal (Uses Rollator for ambulation). Psychiatric:         Speech: Speech normal.         Behavior: Behavior is agitated. Cognition and Memory: She exhibits impaired recent memory. Comments: Expressed desire to return home. Frequently repeated that she does not know what meds she is taking or what is going on [place] because no one tells her. Assessment:       Diagnosis Orders   1. Reactive depression  DULoxetine (CYMBALTA) 30 MG extended release capsule    sertraline (ZOLOFT) 25 MG tablet   2. Medication management  DULoxetine (CYMBALTA) 30 MG extended release capsule    sertraline (ZOLOFT) 25 MG tablet   3. Chronic bilateral low back pain without sciatica  acetaminophen (TYLENOL) 650 MG extended release tablet    DULoxetine (CYMBALTA) 30 MG extended release capsule        Plan:    Sertraline 25 mg PO every other day x 1 week     Then when sertraline is done start duloxetine 30 mg nightly. Acetaminophen 650 mg extended release tablet - one tablet 2x/day   Walk in hallways a 2-3x/day     Return in about 5 weeks (around 6/7/2021) for AWV, pain, diarrhea . No orders of the defined types were placed in this encounter.     Orders Placed This Encounter   Medications    acetaminophen (TYLENOL) 650 MG extended release tablet     Sig: Take 1 tablet by mouth 2 times daily     Dispense:  60 tablet     Refill:  3    DULoxetine (CYMBALTA) 30 MG extended release capsule     Sig: Take 1 capsule by mouth daily     Dispense:  30 capsule     Refill:  3    sertraline (ZOLOFT) 25 MG tablet     Sig: Take 1 tablet by mouth every other day for 7 days     Dispense:  4 tablet     Refill:  0       Patient given educational materials - see patient instructions. Discussed use, benefit, and side effects of prescribed medications. All patient questions answered. Pt voiced understanding. Reviewed health maintenance. Instructed to continue current medications, diet and exercise. Patient agreed with treatment plan. Follow up as directed. Electronically signed by DONNA Puga CNP on 5/3/2021 at 3:32 PM    On the basis of positive falls risk screening, assessment and plan is as follows: Encouraged to increase exercise with rollator.

## 2021-05-03 NOTE — PATIENT INSTRUCTIONS
Sertraline 25 mg PO every other day x 1 week     Then when sertraline is done start duloxetine 30 mg nightly.     Acetaminophen 650 mg extended release tablet - one tablet 2x/day   Walk in hallways a 2-3x/day

## 2021-06-09 ENCOUNTER — TELEPHONE (OUTPATIENT)
Dept: PRIMARY CARE CLINIC | Age: 86
End: 2021-06-09

## 2021-06-09 DIAGNOSIS — C73 THYROID CANCER (HCC): Primary | ICD-10-CM

## 2021-06-09 DIAGNOSIS — E03.9 HYPOTHYROIDISM, UNSPECIFIED TYPE: ICD-10-CM

## 2021-06-09 DIAGNOSIS — R41.82 ALTERED MENTAL STATUS, UNSPECIFIED ALTERED MENTAL STATUS TYPE: ICD-10-CM

## 2021-06-09 NOTE — TELEPHONE ENCOUNTER
Bj Don, patient's daughter, called and states patient is extremely confused. Patient refused to take her medications this morning because she states she already took them. She saw her other daughter in her bed last night. She said somebody came in in the middle the night and asked her to watch the babies. She has also mentions seeing other people in her room during the night. She is very upset and wants to leave [facility]. We will order lab work including UA with culture, CBC with differential.,  TSH, free T4 to be drawn tomorrow morning. She has an appointment with Dr. Mellisa Barton on Friday.

## 2021-06-11 ENCOUNTER — OFFICE VISIT (OUTPATIENT)
Dept: PRIMARY CARE CLINIC | Age: 86
End: 2021-06-11
Payer: MEDICARE

## 2021-06-11 VITALS
BODY MASS INDEX: 30.34 KG/M2 | DIASTOLIC BLOOD PRESSURE: 70 MMHG | HEIGHT: 61 IN | WEIGHT: 160.7 LBS | OXYGEN SATURATION: 95 % | HEART RATE: 70 BPM | SYSTOLIC BLOOD PRESSURE: 128 MMHG

## 2021-06-11 DIAGNOSIS — Z00.00 ROUTINE GENERAL MEDICAL EXAMINATION AT A HEALTH CARE FACILITY: Primary | ICD-10-CM

## 2021-06-11 DIAGNOSIS — R35.0 URINARY FREQUENCY: ICD-10-CM

## 2021-06-11 PROCEDURE — 4040F PNEUMOC VAC/ADMIN/RCVD: CPT | Performed by: FAMILY MEDICINE

## 2021-06-11 PROCEDURE — G0439 PPPS, SUBSEQ VISIT: HCPCS | Performed by: FAMILY MEDICINE

## 2021-06-11 PROCEDURE — 1123F ACP DISCUSS/DSCN MKR DOCD: CPT | Performed by: FAMILY MEDICINE

## 2021-06-11 RX ORDER — CEPHALEXIN 500 MG/1
500 CAPSULE ORAL 3 TIMES DAILY
Qty: 15 CAPSULE | Refills: 0 | Status: SHIPPED | OUTPATIENT
Start: 2021-06-11

## 2021-06-11 ASSESSMENT — PATIENT HEALTH QUESTIONNAIRE - PHQ9
1. LITTLE INTEREST OR PLEASURE IN DOING THINGS: 1
2. FEELING DOWN, DEPRESSED OR HOPELESS: 1
SUM OF ALL RESPONSES TO PHQ QUESTIONS 1-9: 2
SUM OF ALL RESPONSES TO PHQ QUESTIONS 1-9: 2
SUM OF ALL RESPONSES TO PHQ9 QUESTIONS 1 & 2: 2
SUM OF ALL RESPONSES TO PHQ QUESTIONS 1-9: 2

## 2021-06-11 NOTE — PATIENT INSTRUCTIONS
Personalized Preventive Plan for Walter Puentes - 6/11/2021  Medicare offers a range of preventive health benefits. Some of the tests and screenings are paid in full while other may be subject to a deductible, co-insurance, and/or copay. Some of these benefits include a comprehensive review of your medical history including lifestyle, illnesses that may run in your family, and various assessments and screenings as appropriate. After reviewing your medical record and screening and assessments performed today your provider may have ordered immunizations, labs, imaging, and/or referrals for you. A list of these orders (if applicable) as well as your Preventive Care list are included within your After Visit Summary for your review. Other Preventive Recommendations:    · A preventive eye exam performed by an eye specialist is recommended every 1-2 years to screen for glaucoma; cataracts, macular degeneration, and other eye disorders. · A preventive dental visit is recommended every 6 months. · Try to get at least 150 minutes of exercise per week or 10,000 steps per day on a pedometer . · Order or download the FREE \"Exercise & Physical Activity: Your Everyday Guide\" from [employer]. Call 3-455.679.6215 or search [employer] online. · You need 6520-0185 mg of calcium and 1834-8860 IU of vitamin D per day. It is possible to meet your calcium requirement with diet alone, but a vitamin D supplement is usually necessary to meet this goal.  · When exposed to the sun, use a sunscreen that protects against both UVA and UVB radiation with an SPF of 30 or greater. Reapply every 2 to 3 hours or after sweating, drying off with a towel, or swimming. · Always wear a seat belt when traveling in a car. Always wear a helmet when riding a bicycle or motorcycle.

## 2021-06-11 NOTE — PROGRESS NOTES
Medicare Annual Wellness Visit  Name: Glen Feliciano Date: 2021   MRN: [id number] Sex: Female   Age: 80 y.o. Ethnicity: Unavailable/Unknown   : 1927 Race: Unavailable      [place] Garza is here for Medicare AWV    Screenings for behavioral, psychosocial and functional/safety risks, and cognitive dysfunction are all negative except as indicated below. These results, as well as other patient data from the [address] form, are documented in Flowsheets linked to this Encounter. Pt reports frequency of every half hour. Patient dysuria, hematuria. Patient switched from Sertraline to Duloxetine a month ago by Yue Cisneros. Gave patient diarrhea, so now she is on Duloxetine with improved mood. Pt reports back pain. Has tried Biofreeze and tylenol. Brings relief and says she is used to the pain. Patient saw endocrinologist, Dr. Shanique Otero in March or April. Allergies   Allergen Reactions    Acetaminophen      Other reaction(s): Intolerance-unknown    Ibuprofen     Pcn [Penicillins]     Sulfa Antibiotics     Morphine Nausea And Vomiting         Prior to Visit Medications    Medication Sig Taking?  Authorizing Provider   cephALEXin (KEFLEX) 500 MG capsule Take 1 capsule by mouth 3 times daily Yes Alberto Barry MD   busPIRone (BUSPAR) 5 MG tablet  Yes Historical Provider, MD   Docusate Sodium (DSS) 250 MG CAPS Take 250 mg by mouth daily Yes Historical Provider, MD   acetaminophen (TYLENOL) 650 MG extended release tablet Take 1 tablet by mouth 2 times daily Yes DONNA Lazo - CNP   DULoxetine (CYMBALTA) 30 MG extended release capsule Take 1 capsule by mouth daily Yes DONNA Lazo - CNP   levothyroxine (SYNTHROID) 200 MCG tablet Take 1 tablet by mouth Daily Yes DONNA Lazo - CNP   furosemide (LASIX) 40 MG tablet Take 1 tablet by mouth daily Yes DONNA Lazo - CNP   potassium chloride (KLOR-CON M) 10 MEQ extended release tablet Take 1 tablet by mouth daily Yes DONNA Leal CNP   metoprolol tartrate (LOPRESSOR) 25 MG tablet Take 25 mg by mouth daily Yes Historical Provider, MD   donepezil (ARICEPT) 10 MG tablet Take 1 tablet by mouth nightly Yes DONNA Leal CNP   Metoprolol Tartrate 75 MG TABS Take 37.5 mg by mouth daily Yes DONNA Leal CNP   aspirin 81 MG tablet Take 81 mg by mouth daily. Yes Historical Provider, MD   multivitamin [facility]) per tablet Take 1 tablet by mouth daily. Yes Historical Provider, MD   acetaminophen (TYLENOL) 650 MG suppository Place 650 mg rectally every 4 hours as needed. Yes Historical Provider, MD   Calcium Carbonate (CALTRATE 600 PO) Take  by mouth. Yes Historical Provider, MD   sertraline (ZOLOFT) 25 MG tablet Take 1 tablet by mouth every other day for 7 days  DONNA Leal CNP         Past Medical History:   Diagnosis Date    Diverticulosis     GERD (gastroesophageal reflux disease)     H/O tubal ligation     Hip fracture, right (HCC)     History of resection of large bowel     Hypertension     Palpitation     S/P appy     Thyroid cancer ([address]        Past Surgical History:   Procedure Laterality Date    APPENDECTOMY      COLON SURGERY      HIP SURGERY      THYROID SURGERY      THYROIDECTOMY      TUBAL LIGATION           Family History   Problem Relation Age of Onset    Cancer Mother     Heart Disease Father        CareTeam (Including outside providers/suppliers regularly involved in providing care):   Patient Care Team:  Jacki Burden MD as PCP - General (Family Medicine)  Jacki Burden MD as PCP - [facility] Empaneled Provider    Wt Readings from Last 3 Encounters:   06/11/21 160 lb 11.2 oz (72.9 kg)   05/03/21 160 lb 6.4 oz (72.8 kg)   05/22/16 150 lb (68 kg)     Vitals:    06/11/21 1331   BP: 128/70   Pulse: 70   SpO2: 95%   Weight: 160 lb 11.2 oz (72.9 kg)   Height: 5' 0.96\" (1.548 m)     Body mass index is 30.4 kg/m².     Based upon direct observation of the patient, Will ACP-Advance Directive ACP-Power of     Not on File Not on File Not on File Not on File      General Health Risk Interventions:  · Has previously given records of Healthcare POA. Healthcare POA -  daughter - [address] Habits/Nutrition:  Health Habits/Nutrition  Do you exercise for at least 20 minutes 2-3 times per week?: (!) No  Have you lost any weight without trying in the past 3 months?: (!) Yes  Do you eat only one meal per day?: No  Have you seen the dentist within the past year?: N/A - wear dentures  Body mass index: (!) 30.4  Health Habits/Nutrition Interventions:  · Encouraged to stay in room due to Jose Eliasfortino. Hearing/Vision:  No exam data present  Hearing/Vision  Do you or your family notice any trouble with your hearing that hasn't been managed with hearing aids?: No  Do you have difficulty driving, watching TV, or doing any of your daily activities because of your eyesight?: No  Have you had an eye exam within the past year?: (!) No  Hearing/Vision Interventions:  · Patient has no concerns with vision. ADL:  ADLs  In the past 7 days, did you need help from others to perform any of the following everyday activities? Eating, dressing, grooming, bathing, toileting, or walking/balance?: None  In the past 7 days, did you need help from others to take care of any of the following? Laundry, housekeeping, banking/finances, shopping, telephone use, food preparation, transportation, or taking medications?: (!) Banking/Finances, Shopping, Transportation  ADL Interventions:  · Daughter Halbert Sacks helps with banking, finances, shopping and transportation.     Personalized Preventive Plan   Current Health Maintenance Status  Immunization History   Administered Date(s) Administered    COVID-19, Mason Peter, PF, 30mcg/0.3mL 01/29/2021, 02/19/2021    Influenza Virus Vaccine 11/25/2020    Influenza, High Dose (Fluzone 65 yrs and older) 11/11/2019    Pneumococcal Polysaccharide ([id number]) 11/11/2019        Health Maintenance   Topic Date Due    DTaP/Tdap/Td vaccine (1 - Tdap) Never done    Shingles Vaccine (1 of 2) Never done   [employer] Visit (AWV)  Never done    Potassium monitoring  01/22/2022    Creatinine monitoring  01/22/2022    TSH testing  03/02/2022    Flu vaccine  Completed    Pneumococcal 65+ years Vaccine  Completed    COVID-19 Vaccine  Completed    Hepatitis A vaccine  Aged Out    Hepatitis B vaccine  Aged Out    Hib vaccine  Aged Out    Meningococcal (ACWY) vaccine  Aged Out     Recommendations for Anna Lozabai Due: see orders and patient instructions/AVS.  . Recommended screening schedule for the next 5-10 years is provided to the patient in written form: see Patient Enrrique Borja was seen today for medicare awv. Diagnoses and all orders for this visit:    Routine general medical examination at a health care facility    Urinary frequency  -     cephALEXin (KEFLEX) 500 MG capsule;  Take 1 capsule by mouth 3 times daily

## 2021-06-14 DIAGNOSIS — R41.82 ALTERED MENTAL STATUS, UNSPECIFIED ALTERED MENTAL STATUS TYPE: ICD-10-CM

## 2021-06-14 DIAGNOSIS — E03.9 HYPOTHYROIDISM, UNSPECIFIED TYPE: ICD-10-CM

## 2021-06-14 DIAGNOSIS — C73 THYROID CANCER (HCC): ICD-10-CM
